# Patient Record
Sex: FEMALE | Race: WHITE | NOT HISPANIC OR LATINO | Employment: FULL TIME | URBAN - METROPOLITAN AREA
[De-identification: names, ages, dates, MRNs, and addresses within clinical notes are randomized per-mention and may not be internally consistent; named-entity substitution may affect disease eponyms.]

---

## 2021-12-07 ENCOUNTER — APPOINTMENT (OUTPATIENT)
Dept: GENERAL RADIOLOGY | Facility: HOSPITAL | Age: 41
End: 2021-12-07

## 2021-12-07 ENCOUNTER — HOSPITAL ENCOUNTER (EMERGENCY)
Facility: HOSPITAL | Age: 41
Discharge: HOME | End: 2021-12-07
Attending: EMERGENCY MEDICINE | Admitting: EMERGENCY MEDICINE

## 2021-12-07 VITALS
HEART RATE: 68 BPM | TEMPERATURE: 98.3 F | DIASTOLIC BLOOD PRESSURE: 83 MMHG | OXYGEN SATURATION: 95 % | HEIGHT: 68 IN | WEIGHT: 145 LBS | RESPIRATION RATE: 18 BRPM | SYSTOLIC BLOOD PRESSURE: 128 MMHG | BODY MASS INDEX: 21.98 KG/M2

## 2021-12-07 DIAGNOSIS — S83.004A DISLOCATION OF RIGHT PATELLA, INITIAL ENCOUNTER: Primary | ICD-10-CM

## 2021-12-07 PROCEDURE — 96374 THER/PROPH/DIAG INJ IV PUSH: CPT

## 2021-12-07 PROCEDURE — 73560 X-RAY EXAM OF KNEE 1 OR 2: CPT

## 2021-12-07 PROCEDURE — 25010000002 MORPHINE PER 10 MG: Performed by: NURSE PRACTITIONER

## 2021-12-07 PROCEDURE — 96375 TX/PRO/DX INJ NEW DRUG ADDON: CPT

## 2021-12-07 PROCEDURE — 99284 EMERGENCY DEPT VISIT MOD MDM: CPT

## 2021-12-07 PROCEDURE — 25010000002 ONDANSETRON PER 1 MG: Performed by: NURSE PRACTITIONER

## 2021-12-07 RX ORDER — IBUPROFEN 600 MG/1
600 TABLET ORAL EVERY 6 HOURS PRN
Qty: 30 TABLET | Refills: 0 | Status: SHIPPED | OUTPATIENT
Start: 2021-12-07 | End: 2021-12-07 | Stop reason: SDUPTHER

## 2021-12-07 RX ORDER — IBUPROFEN 600 MG/1
600 TABLET ORAL EVERY 6 HOURS PRN
Qty: 30 TABLET | Refills: 0 | Status: SHIPPED | OUTPATIENT
Start: 2021-12-07

## 2021-12-07 RX ORDER — CYCLOBENZAPRINE HCL 5 MG
5 TABLET ORAL 3 TIMES DAILY PRN
Qty: 15 TABLET | Refills: 0 | Status: SHIPPED | OUTPATIENT
Start: 2021-12-07

## 2021-12-07 RX ORDER — ACETAMINOPHEN 500 MG
1000 TABLET ORAL EVERY 8 HOURS PRN
Qty: 30 TABLET | Refills: 0 | Status: SHIPPED | OUTPATIENT
Start: 2021-12-07

## 2021-12-07 RX ORDER — ONDANSETRON 2 MG/ML
4 INJECTION INTRAMUSCULAR; INTRAVENOUS ONCE
Status: COMPLETED | OUTPATIENT
Start: 2021-12-07 | End: 2021-12-07

## 2021-12-07 RX ORDER — MORPHINE SULFATE 2 MG/ML
4 INJECTION, SOLUTION INTRAMUSCULAR; INTRAVENOUS ONCE
Status: COMPLETED | OUTPATIENT
Start: 2021-12-07 | End: 2021-12-07

## 2021-12-07 RX ORDER — CYCLOBENZAPRINE HCL 5 MG
5 TABLET ORAL 3 TIMES DAILY PRN
Qty: 15 TABLET | Refills: 0 | Status: SHIPPED | OUTPATIENT
Start: 2021-12-07 | End: 2021-12-07 | Stop reason: SDUPTHER

## 2021-12-07 RX ORDER — ACETAMINOPHEN 500 MG
1000 TABLET ORAL EVERY 8 HOURS PRN
Qty: 30 TABLET | Refills: 0 | Status: SHIPPED | OUTPATIENT
Start: 2021-12-07 | End: 2021-12-07 | Stop reason: SDUPTHER

## 2021-12-07 RX ORDER — SODIUM CHLORIDE 0.9 % (FLUSH) 0.9 %
10 SYRINGE (ML) INJECTION AS NEEDED
Status: DISCONTINUED | OUTPATIENT
Start: 2021-12-07 | End: 2021-12-07 | Stop reason: HOSPADM

## 2021-12-07 RX ADMIN — ONDANSETRON 4 MG: 2 INJECTION INTRAMUSCULAR; INTRAVENOUS at 16:08

## 2021-12-07 RX ADMIN — MORPHINE SULFATE 4 MG: 2 INJECTION, SOLUTION INTRAMUSCULAR; INTRAVENOUS at 16:08

## 2021-12-07 NOTE — ED NOTES
Pt to er from work via ems for r. Patellar dislocation. Pt reports it happens normally and goes back into place, but it did not today. This rn wearing mask and goggles. Pt wearing mask during encounter.        Susan Byers RN  12/07/21 1521

## 2021-12-07 NOTE — ED PROVIDER NOTES
EMERGENCY DEPARTMENT ENCOUNTER    Room Number:  39/39  Date of encounter:  12/7/2021  PCP: Provider, No Known  Historian: Patient      PPE    Patient was placed in face mask in first look. Patient was wearing facemask when I entered the room and throughout our encounter. I wore full protective equipment throughout this patient encounter including a CAPR face mask, and gloves. Hand hygiene was performed before donning protective equipment and after removal when leaving the room.        HPI:  Chief Complaint: Right knee pain  A complete HPI/ROS/PMH/PSH/SH/FH are unobtainable due to: Nothing    Context: Yumi Gonzalez is a 41 y.o. female who arrives to the ED via EMS from work.  Patient presents with c/o moderate, constant, nothing right knee pain status post injury prior to arrival.  Patient states she was standing up from the chair when she felt her kneecap dislocate.  She states this happens occasionally and she is usually able to put it back in place however not today.  Patient states this did cause her to fall however she did not hit her head or sustain any other injuries.  Patient denies LOC, neck pain, back pain, nausea, vomiting or any other symptoms.  Patient states that nothing makes the symptoms better and movement worsens symptoms.          PAST MEDICAL HISTORY  Active Ambulatory Problems     Diagnosis Date Noted   • No Active Ambulatory Problems     Resolved Ambulatory Problems     Diagnosis Date Noted   • No Resolved Ambulatory Problems     No Additional Past Medical History         PAST SURGICAL HISTORY  No past surgical history on file.      FAMILY HISTORY  No family history on file.      SOCIAL HISTORY  Social History     Socioeconomic History   • Marital status:          ALLERGIES  Trileptal [oxcarbazepine] and Sulfa antibiotics        REVIEW OF SYSTEMS  Review of Systems     All systems reviewed and negative except for those discussed in HPI.        PHYSICAL EXAM    ED Triage Vitals [12/07/21  1523]   Temp Heart Rate Resp BP SpO2   98.3 °F (36.8 °C) 72 18 115/60 97 %       Physical Exam  GENERAL: Well appearing, nontoxic appearing, not distressed  HENT: normocephalic, atraumatic  EYES: no scleral icterus, PERRL  CV: regular rhythm, regular rate, no murmur  RESPIRATORY: normal effort, CTAB  ABDOMEN: soft   MUSCULOSKELETAL:   Right knee-patella in place, mild swelling, no deformity, limited flexion due to pain, no redness, warmth. 2+ DP and PT pulses on right  No cervical, thoracic or lumbar tenderness  NEURO: alert, moves all extremities, follows commands, mental status normal/baseline  SKIN: warm, dry, no rash   Psych: Appropriate mood and affect  Nursing notes and vital signs reviewed      LAB RESULTS  No results found for this or any previous visit (from the past 24 hour(s)).    Ordered the above labs and independently reviewed the results.      RADIOLOGY  XR Knee 1 or 2 View Right    Result Date: 12/7/2021  XR KNEE 1 OR 2 VW RIGHT-  12/07/2021  HISTORY: Knee pain. Possible patellar dislocation.  2 views of the right knee demonstrate no acute bone, joint or soft tissue abnormalities. There may be very minimal medial tibiofemoral joint space narrowing.  This report was finalized on 12/7/2021 4:32 PM by Dr. Jovani Shanks M.D.        I ordered the above noted radiological studies and viewed the images on the PACS system.       MEDICAL RECORD REVIEW  No medical records reviewed in epic      PROCEDURES    Procedures        DIFFERENTIAL DIAGNOSIS  Differential Diagnosis for Lower Extremity Injury/trauma include but are not limited to the following:    Hip fracture/dislocation/contusion/sprain  Pelvic Fracture  Knee contusion/sprain/fracture/dislocation/internal derangement  Fracture/sprain of the femur, tibia, fibula, ankle, foot or digits  Claudication, Peripheral Vascular Disease, Gout, Osteoarthritis, Bursitis, Septic Joint,          PROGRESS, DATA ANALYSIS, CONSULTS, AND MEDICAL DECISION  MAKING        ED Course as of 12/07/21 1828   Tue Dec 07, 2021   2564 Discussed pertinent information from history and physical exam with patient.  Discussed differential diagnosis and plan for ED evaluation/work-up and treatment including x-ray of the right knee to evaluate for dislocation or other bony abnormalities and pain control.  All questions answered.  Patient is agreeable with this plan.     [MS]   1628 I reviewed x-rays in PACS, my interpretation is: No fracture dislocation or effusion. [JG]   1638 X-rays negative for fracture.  Patient symptoms consistent with patellar dislocation with spontaneous relocation.  Provided with knee immobilizer and crutches, discussed need for close follow-up with primary care and physical therapy.  Plan for treatment with anti-inflammatories, muscle relaxers.  Extensive discussion return precautions.  Patient agreeable with plan, no questions or concerns. [JG]   1638 The patient was reexamined.  They have had symptomatic improvement during their ED stay.  I discussed today's findings with the patient, explaining the pertinent positives and negatives from today's visit, and the plan of care.  Discussed plan for discharge as there is no emergent indication for admission.  Discussed limitation of the ED work-up and that this is to rule out life-threatening emergencies but that they could require further testing as determined by their primary care and or any referred specialist patient is agreeable and understands need for follow-up and repeat exam/testing.  Patient is aware that discharge does not mean there is nothing wrong, indicates no emergency is present, and that they must continue their care with their primary care physician and/or any referred specialist.  They were given appropriate follow-up with their primary care physician and/or specialist.  I had an extensive discussion on the expected clinical course and return precautions.  Patient understands to return to the  emergency department for continuation, worsening, or new symptoms.  I answered any of the patient's questions. Patient was discharged home in a stable condition.     [JG]   0594 Reviewed pt's history and workup with Dr. Roberto.  After a bedside evaluation, they agree with the plan of care.       [MS]      ED Course User Index  [JG] Neo Roberto MD  [MS] Azra Diaz, SANJU     Discussed plan for discharge, as there is no emergent indication for admission. Pt/family is agreeable and understands need for follow up and repeat testing.  Pt is aware that discharge does not mean that nothing is wrong but it indicates no emergency is present that requires admission and they must continue care with follow-up as given below or physician of their choice.   Patient/Family voiced understanding of above instructions.  Patient discharged in stable condition.    DIAGNOSIS  Final diagnoses:   Dislocation of right patella, initial encounter       FOLLOW UP   Your PCP    Schedule an appointment as soon as possible for a visit in 2 days  even if well    PATIENT CONNECTION - [place] [address]  [phone]    if you are unable to follow up with your PCP    Jeff Zaragoza MD  [address] [facility] [address]  [place] [address]  [phone]    Schedule an appointment as soon as possible for a visit in 2 days        RX     Medication List      New Prescriptions    acetaminophen 500 MG tablet  Commonly known as: TYLENOL  Take 2 tablets by mouth Every 8 (Eight) Hours As Needed for Mild Pain .     cyclobenzaprine 5 MG tablet  Commonly known as: FLEXERIL  Take 1 tablet by mouth 3 (Three) Times a Day As Needed for Muscle Spasms.     ibuprofen 600 MG tablet  Commonly known as: ADVIL,MOTRIN  Take 1 tablet by mouth Every 6 (Six) Hours As Needed for Mild Pain .           Where to Get Your Medications      You can get these medications from any pharmacy    Bring a paper prescription for each of these  medications  · acetaminophen 500 MG tablet  · cyclobenzaprine 5 MG tablet  · ibuprofen 600 MG tablet           MEDICATIONS GIVEN IN ED    Medications   sodium chloride 0.9 % flush 10 mL (has no administration in time range)   morphine injection 4 mg (4 mg Intravenous Given 12/7/21 1608)   ondansetron (ZOFRAN) injection 4 mg (4 mg Intravenous Given 12/7/21 1608)           COURSE & MEDICAL DECISION MAKING  Any/All labs and Any/All Imaging studies that were ordered were reviewed and are noted above.  Results were reviewed/discussed with the patient and they were also made aware of online access.    Pt also made aware that some labs, such as cultures, will not be resulted during ER visit and followup with PMD is necessary.        Azra Diaz, APRN  12/07/21 1823

## 2021-12-07 NOTE — ED PROVIDER NOTES
MD ATTESTATION NOTE  Patient was placed in face mask in first look and the following protective measures were taken unless documented below in the ED course. Patient was wearing facemask when I entered the room and throughout our encounter. I wore full protective equipment throughout this patient encounter including a N95 face mask, googles, gown and gloves. Hand hygiene was performed before donning protective equipment and after removal when leaving the room.    The CASTRO and I have discussed this patient's history, physical exam, and treatment plan. I have reviewed the documentation and personally had a face to face interaction with the patient. I affirm the CASTRO documentation and agree with their diagnostics, findings, treatment, plan, and disposition.  The attached note describes my personal findings.    Yumi Porter is a 41 y.o. female who presents to the ED c/o right knee pain.  Patient reports that she stood up and felt her kneecap slide to the side, was unable stand afterwards, denied any other injury occurred.  Patient reports that her knee was wrapped, was having severe pain, pain became more mild, complains of dull pain in her knee at present.  Patient reports her kneecap now appears to be in normal place.  Patient denies any strain or inciting event prior to this, no injury.  Patient does report that she has had some twinges of knee pain over the last week.  Patient reports that she has had her knee Slide to the side before but has always spontaneously reduced, has never had a dislocated patella that required reduction.  Patient denies any other pain, no injury, no weakness or numbness, no skin tear.    On exam:  General: NAD.  Head: NCAT.  ENT: EOMI, PERRLA, MMM.  Neck: Supple, trachea midline.  Cardiac: regular rate and rhythm.  Lungs: CTAB.  Abdomen: Soft, NTTP, no rebound tenderness/guarding/rigidity.   : Deferred to CASTRO.  Extremities: Moves all extremities well, no peripheral edema.  Right knee:  Patella is in place, no crepitus or deformity, patient has trace swelling, no effusion, skin is normal, no redness warmth or induration.  Knee has limited flexion secondary to pain, neurovascular intact distally.  Skin: Warm, dry.    LAB RESULTS  No results found for this or any previous visit (from the past 24 hour(s)).    I ordered the above labs and reviewed the results.    RADIOLOGY  XR Knee 1 or 2 View Right    Result Date: 12/7/2021  XR KNEE 1 OR 2 VW RIGHT-  12/07/2021  HISTORY: Knee pain. Possible patellar dislocation.  2 views of the right knee demonstrate no acute bone, joint or soft tissue abnormalities. There may be very minimal medial tibiofemoral joint space narrowing.  This report was finalized on 12/7/2021 4:32 PM by Dr. Jovani Shanks M.D.        I ordered the above noted radiological studies. I reviewed the images and results. I agree with the radiologist interpretation.    PROCEDURES  Procedures    MEDICATIONS GIVEN IN ER  Medications   sodium chloride 0.9 % flush 10 mL (has no administration in time range)   morphine injection 4 mg (4 mg Intravenous Given 12/7/21 1608)   ondansetron (ZOFRAN) injection 4 mg (4 mg Intravenous Given 12/7/21 1608)       PROGRESS, DATA ANALYSIS, CONSULTS, AND MEDICAL DECISION MAKING  A complete history and physical exam have been performed.  All available laboratory and imaging results have been reviewed by myself prior to disposition.  Face mask and gloves were worn throughout the patient encounter, unless additional PPE was worn and specified below. Hand hygiene was performed before entering and after leaving the patient room.  [facility]    ED Course as of 12/07/21 1639   Tue Dec 07, 2021   0787 Discussed pertinent information from history and physical exam with patient.  Discussed differential diagnosis and plan for ED evaluation/work-up and treatment including x-ray of the right knee to evaluate for dislocation or other bony abnormalities and pain control.  All  questions answered.  Patient is agreeable with this plan.     [MS]   1628 I reviewed x-rays in PACS, my interpretation is: No fracture dislocation or effusion. [JG]   1638 X-rays negative for fracture.  Patient symptoms consistent with patellar dislocation with spontaneous relocation.  Provided with knee immobilizer and crutches, discussed need for close follow-up with primary care and physical therapy.  Plan for treatment with anti-inflammatories, muscle relaxers.  Extensive discussion return precautions.  Patient agreeable with plan, no questions or concerns. [JG]   1638 The patient was reexamined.  They have had symptomatic improvement during their ED stay.  I discussed today's findings with the patient, explaining the pertinent positives and negatives from today's visit, and the plan of care.  Discussed plan for discharge as there is no emergent indication for admission.  Discussed limitation of the ED work-up and that this is to rule out life-threatening emergencies but that they could require further testing as determined by their primary care and or any referred specialist patient is agreeable and understands need for follow-up and repeat exam/testing.  Patient is aware that discharge does not mean there is nothing wrong, indicates no emergency is present, and that they must continue their care with their primary care physician and/or any referred specialist.  They were given appropriate follow-up with their primary care physician and/or specialist.  I had an extensive discussion on the expected clinical course and return precautions.  Patient understands to return to the emergency department for continuation, worsening, or new symptoms.  I answered any of the patient's questions. Patient was discharged home in a stable condition.     [JG]      ED Course User Index  [JG] Neo Roberto MD  [MS] Azra Diaz, SANJU       AS OF 16:39 EST VITALS:    BP - 115/60  HR - 72  TEMP - 98.3 °F (36.8 °C)  (Oral)  O2 SATS - 97%    DIAGNOSIS  Final diagnoses:   Dislocation of right patella, initial encounter         DISPOSITION  DISCHARGE    Patient discharged in stable condition.    Reviewed implications of results, diagnosis, meds, responsibility to follow up, warning signs and symptoms of possible worsening, potential complications and reasons to return to ER.    Patient/Family voiced understanding of above instructions.    Discussed plan for discharge, as there is no emergent indication for admission. Patient referred to primary care provider for BP management due to today's BP. Pt/family is agreeable and understands need for follow up and repeat testing.  Pt is aware that discharge does not mean that nothing is wrong but it indicates no emergency is present that requires admission and they must continue care with follow-up as given below or physician of their choice.     FOLLOW-UP  Your PCP    Schedule an appointment as soon as possible for a visit in 2 days  even if well    PATIENT CONNECTION - [place] [address]  [phone]    if you are unable to follow up with your PCP    Jeff Zaragoza MD  [address] [facility] [address]  [place] [address]  [phone]    Schedule an appointment as soon as possible for a visit in 2 days           Medication List      New Prescriptions    acetaminophen 500 MG tablet  Commonly known as: TYLENOL  Take 2 tablets by mouth Every 8 (Eight) Hours As Needed for Mild Pain .     cyclobenzaprine 5 MG tablet  Commonly known as: FLEXERIL  Take 1 tablet by mouth 3 (Three) Times a Day As Needed for Muscle Spasms.     ibuprofen 600 MG tablet  Commonly known as: ADVIL,MOTRIN  Take 1 tablet by mouth Every 6 (Six) Hours As Needed for Mild Pain .           Where to Get Your Medications      You can get these medications from any pharmacy    Bring a paper prescription for each of these medications  · acetaminophen 500 MG tablet  · cyclobenzaprine 5 MG tablet  · ibuprofen 600  MG tablet            Neo Roberto MD  12/07/21 6657